# Patient Record
Sex: FEMALE
[De-identification: names, ages, dates, MRNs, and addresses within clinical notes are randomized per-mention and may not be internally consistent; named-entity substitution may affect disease eponyms.]

---

## 2018-06-24 ENCOUNTER — HOSPITAL ENCOUNTER (EMERGENCY)
Dept: HOSPITAL 92 - ERS | Age: 33
Discharge: HOME | End: 2018-06-24
Payer: COMMERCIAL

## 2018-06-24 DIAGNOSIS — F41.9: ICD-10-CM

## 2018-06-24 DIAGNOSIS — S80.02XA: ICD-10-CM

## 2018-06-24 DIAGNOSIS — S16.1XXA: ICD-10-CM

## 2018-06-24 DIAGNOSIS — S80.01XA: ICD-10-CM
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
## 2018-06-24 DIAGNOSIS — S60.221A: ICD-10-CM

## 2018-06-24 DIAGNOSIS — S06.0X0A: Primary | ICD-10-CM
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
## 2018-06-24 PROCEDURE — 70450 CT HEAD/BRAIN W/O DYE: CPT

## 2018-06-24 PROCEDURE — 96372 THER/PROPH/DIAG INJ SC/IM: CPT
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
